# Patient Record
Sex: MALE | Race: WHITE
[De-identification: names, ages, dates, MRNs, and addresses within clinical notes are randomized per-mention and may not be internally consistent; named-entity substitution may affect disease eponyms.]

---

## 2019-10-20 ENCOUNTER — HOSPITAL ENCOUNTER (EMERGENCY)
Dept: HOSPITAL 5 - ED | Age: 19
Discharge: HOME | End: 2019-10-20
Payer: SELF-PAY

## 2019-10-20 VITALS — SYSTOLIC BLOOD PRESSURE: 107 MMHG | DIASTOLIC BLOOD PRESSURE: 61 MMHG

## 2019-10-20 DIAGNOSIS — J40: Primary | ICD-10-CM

## 2019-10-20 DIAGNOSIS — F17.200: ICD-10-CM

## 2019-10-20 PROCEDURE — 71046 X-RAY EXAM CHEST 2 VIEWS: CPT

## 2019-10-20 PROCEDURE — 94640 AIRWAY INHALATION TREATMENT: CPT

## 2019-10-20 PROCEDURE — 99283 EMERGENCY DEPT VISIT LOW MDM: CPT

## 2019-10-20 PROCEDURE — 96372 THER/PROPH/DIAG INJ SC/IM: CPT

## 2019-10-20 NOTE — EVENT NOTE
ED Screening Note


Date of service: 10/20/19


Time: 17:25


ED Screening Note: 





This is a 19 y.o. M. that presents to the ER with chills, cough, and chest 

discomfort for 2 weeks.





Current smoker





Taking OTC medication with minimal relief.





This initial assessment/diagnostic orders/clinical plan/treatment(s) is/are 

subject to change based on patients health status, clinical progression and re-

assessment by fellow clinical providers in the ED. Further treatment and workup 

at subsequent clinical providers discretion. Patient/guardian urged not to elope

from the ED as their condition may be serious if not clinically assessed and 

managed. 





Initial orders include: 





CXR

## 2019-10-20 NOTE — EMERGENCY DEPARTMENT REPORT
ED General Adult HPI





- General


Chief complaint: Upper Respiratory Infection


Stated complaint: FLU LIKE SYM


Time Seen by Provider: 10/20/19 17:25


Source: patient


Mode of arrival: Ambulatory


Limitations: No Limitations





- History of Present Illness


Initial comments: 


20 yo BM states that he has been coughing, having chills and pain when taking 

deep breaths x 2 weeks. He states that it began after working outside in the 

rain for an extended period of time.





-: week(s) (2)


Location: chest


Radiation: abdomen


Severity scale (0 -10): 5


Quality: aching, constant


Improves with: none


Worsens with: movement


Associated Symptoms: malaise


Treatments Prior to Arrival: other (OTC cold and flu medications)





- Related Data


                                  Previous Rx's











 Medication  Instructions  Recorded  Last Taken  Type


 


Permethrin 5% [Acticin 5% CREAM] 1 applicatio TP ONCE #1 tube 11/19/15 Unknown 

Rx


 


diphenhydrAMINE [Benadryl CAP] 25 mg PO Q6HR PRN #15 capsule 11/19/15 Unknown Rx


 


ALBUTEROL Inhaler (OR & NICU) 2 puff IH QID PRN 30 Days #1 10/20/19 Unknown Rx





[Proair] inhalation   


 


Azithromycin [Zithromax TAB] 250 mg PO QDAY 5 Days #6 tablet 10/20/19 Unknown Rx











                                    Allergies











Allergy/AdvReac Type Severity Reaction Status Date / Time


 


No Known Allergies Allergy   Verified 10/20/19 17:00














ED Review of Systems


ROS: 


Stated complaint: FLU LIKE SYM


Other details as noted in HPI





Constitutional: chills, malaise.  denies: diaphoresis, fever


Eyes: denies: eye pain, eye discharge, vision change


ENT: congestion.  denies: ear pain, throat pain


Respiratory: cough, wheezing


Cardiovascular: denies: chest pain, palpitations, dyspnea on exertion


Endocrine: no symptoms reported


Gastrointestinal: abdominal pain, diarrhea.  denies: nausea, vomiting


Genitourinary: denies: urgency, dysuria, frequency


Musculoskeletal: denies: back pain, joint swelling, arthralgia


Skin: denies: rash, lesions


Neurological: as per HPI.  denies: headache, weakness


Psychiatric: denies: anxiety, depression


Hematological/Lymphatic: denies: easy bleeding, easy bruising, swollen glands





ED Past Medical Hx





- Past Medical History


Previous Medical History?: Yes


Hx Hypertension: No


Hx CVA: No


Hx Heart Attack/AMI: No


Hx Congestive Heart Failure: No


Hx Diabetes: No


Hx Pulmonary Embolism: No


Hx GERD: No


Hx Liver Disease: No


Hx Renal Disease: No


Hx of Cancer: No


Hx Sickle Cell Disease: No


Hx Arthritis: No


Hx Headaches / Migraines: No


Hx Seizures: No


Hx Kidney Stones: No


Hx Psychiatric Treatment: No


Hx Asthma: No


Hx COPD: No


Hx Tuberculosis: No


Hx Dementia: No


Hx HIV: No


Additional medical history: Pt states that he had chronic bronchitis as a child.





- Surgical History


Past Surgical History?: No


Hx Coronary Stent: No


Hx Open Heart Surgery: No


Hx Pacemaker: No


Hx Internal Defibrillator: No


Hx Cholecystectomy: No


Hx Appendectomy: No


Hx Breast Surgery: No





- Social History


Smoking Status: Current Every Day Smoker


Substance Use Type: None





- Medications


Home Medications: 


                                Home Medications











 Medication  Instructions  Recorded  Confirmed  Last Taken  Type


 


Permethrin 5% [Acticin 5% CREAM] 1 applicatio TP ONCE #1 tube 11/19/15  Unknown 

Rx


 


diphenhydrAMINE [Benadryl CAP] 25 mg PO Q6HR PRN #15 capsule 11/19/15  Unknown 

Rx


 


ALBUTEROL Inhaler (OR & NICU) 2 puff IH QID PRN 30 Days #1 10/20/19  Unknown Rx





[Proair] inhalation    


 


Azithromycin [Zithromax TAB] 250 mg PO QDAY 5 Days #6 tablet 10/20/19  Unknown R

x














ED Physical Exam





- General


Limitations: No Limitations


General appearance: alert, in no apparent distress, appears intoxicated





- Head


Head exam: Present: atraumatic, normocephalic





- Eye


Eye exam: Present: normal appearance, PERRL, EOMI


Pupils: Present: normal accommodation.  Absent: irregular, unequal, miosis





- ENT


ENT exam: Present: normal exam, normal orophraynx, TM's normal bilaterally, 

other (maxillary sinus tenderness with palpation)





- Neck


Neck exam: Present: normal inspection, tenderness, meningismus





- Respiratory


Respiratory exam: Present: accessory muscle use, decreased breath sounds.  

Absent: wheezes, rales, rhonchi





- Cardiovascular


Cardiovascular Exam: Present: regular rate, normal rhythm, normal heart sounds





- GI/Abdominal


GI/Abdominal exam: Present: soft, distended, tenderness (tenderness of 

epigastric region with palpation)





- Rectal


Rectal exam: Present: deferred





- Extremities Exam


Extremities exam: Present: normal inspection, full ROM, tenderness





- Back Exam


Back exam: Present: normal inspection, full ROM





- Neurological Exam


Neurological exam: Present: alert, altered, oriented X3





- Psychiatric


Psychiatric exam: Present: normal affect, normal mood





- Skin


Skin exam: Present: warm, dry, intact





ED Course


                                   Vital Signs











  10/20/19 10/20/19





  17:25 20:54


 


Temperature 98.1 F 99.0 F


 


Pulse Rate 102 H 107 H


 


Respiratory 20 18





Rate  


 


Blood Pressure 131/74 


 


Blood Pressure  107/61





[Right]  


 


O2 Sat by Pulse 97 97





Oximetry  














ED Medical Decision Making





- Medical Decision Making


20 yo BM states that he has been coughing, having chills and pain when taking 

deep breaths x 2 weeks. He states that it began after working outside in the 

rain for an extended period of time. Pt was given decadron injection and Duo-

nebulizer treatment. After the injection and nebulizer treatment, the pt was 

reassessed and he stated that he can breathe better and feels some relief of his

 symptoms. The pt was informed that he will be given antibiotics and an inhaler;

 take medications as prescribed. He was instructed to f/u with his PCP and 

further informed that if symptoms worsen to return for assessment and care.


Critical care attestation.: 


If time is entered above; I have spent that time in minutes in the direct care 

of this critically ill patient, excluding procedure time.








ED Disposition


Clinical Impression: 


 Bronchitis





Disposition: DC-01 TO HOME OR SELFCARE


Is pt being admited?: No


Does the pt Need Aspirin: No


Condition: Stable


Instructions:  Acute Bronchitis (ED)


Additional Instructions: 


The pt was informed that he will be given antibiotics and an inhaler; take 

medications as prescribed. He was instructed to f/u with his PCP and further 

informed that if symptoms worsen to return for assessment and care.


Prescriptions: 


ALBUTEROL Inhaler (OR & NICU) [Proair] 2 puff IH QID PRN 30 Days #1 inhalation


 PRN Reason: Shortness Of Breath


Azithromycin [Zithromax TAB] 250 mg PO QDAY 5 Days #6 tablet


Referrals: 


[facility] [Outside] - 3-5 Days


Forms:  Work/School Release Form(ED)


Time of Disposition: 20:34


Print Language: ENGLISH

## 2019-10-20 NOTE — XRAY REPORT
CHEST 2 VIEWS 



INDICATION / CLINICAL INFORMATION:

cough. 



COMPARISON: 

None available.



FINDINGS:



SUPPORT DEVICES: None.

HEART / MEDIASTINUM: No significant abnormality. 

LUNGS / PLEURA: No significant pulmonary or pleural abnormality. No pneumothorax. 



ADDITIONAL FINDINGS: No significant additional findings.



IMPRESSION:

1. No acute findings. 

 



Signer Name: Lavon Ponce MD 

Signed: 10/20/2019 5:59 PM

 Workstation Name: [id number]

## 2020-11-22 ENCOUNTER — HOSPITAL ENCOUNTER (EMERGENCY)
Dept: HOSPITAL 5 - ED | Age: 20
LOS: 1 days | End: 2020-11-23
Payer: SELF-PAY

## 2020-11-22 VITALS — DIASTOLIC BLOOD PRESSURE: 85 MMHG | SYSTOLIC BLOOD PRESSURE: 130 MMHG

## 2020-11-22 DIAGNOSIS — Z00.8: Primary | ICD-10-CM

## 2020-11-22 DIAGNOSIS — Z53.21: ICD-10-CM
